# Patient Record
Sex: FEMALE | Race: WHITE
[De-identification: names, ages, dates, MRNs, and addresses within clinical notes are randomized per-mention and may not be internally consistent; named-entity substitution may affect disease eponyms.]

---

## 2017-08-12 NOTE — ERA
ER Documentation


Chief Complaint


Date/Time


DATE: 8/12/17 


TIME: 01:49


Chief Complaint


intermittent chest pain x 8 days, sob today





HPI


This 47-year-old female presents with chest pain on and off for 8 days.  Today 

when she had the pain she had shortness of breath.  She currently has 

substernal pain described as a pressure-like sensation.  Denies all cardiac 

risk factors.





ROS


All systems reviewed and are negative except as per history of present illness.





Medications


Home Meds


Active Scripts


Omeprazole* (Omeprazole*) 20 Mg Capsule.dr, 20 MG PO DAILY, #30 CAP


   Prov:SEVERO NARVAEZ          8/12/17


Ranitidine Hcl* (Zantac*) 150 Mg Tablet, 150 MG PO BID, #60 TAB


   Prov:SOLANGESEVERO DO         8/12/17


Discontinued Scripts


Nitrofurantoin Monohyd Macrocr* (Macrobid*) 100 Mg Capsr, 100 MG PO BID for 7 

Days, CAP


   Prov:CHARIS NEAL MD         3/18/16


Pantoprazole (Protonix) 40 Mg Tabec, 40 MG PO DAILY, #30 TAB


   Prov:LIVAN DUEÑAS MD         2/2/16


Metoclopramide* (Reglan*) 10 Mg Tablet, 10 MG PO Q6H Y for NAUSEA AND OR 

VOMITING, #30 TAB


   Prov:LIVAN DUEÑAS MD         2/2/16


Hydrocodone Bit/Acetaminophen (Anexsia 5-325 Mg Tablet) 1 Tab Tab, 1 TAB PO Q8 

Y for MODERATE PAIN LEVEL 4-6, #30 TAB


   Prov:LIVAN DUEÑAS MD         12/16/15


Ibuprofen* (Ibuprofen*) 600 Mg Tab, 600 MG PO Q8 Y for Fever, pain , #30 TAB


   Prov:LIVAN DUEÑAS MD         12/16/15





Allergies


Allergies:  


Coded Allergies:  


     No Known Allergy (Unverified , 8/12/17)





PMhx/Soc


History of Surgery:  Yes (hx of tubal ligation, hx of cystoscopy and stent 

placement)


Anesthesia Reaction:  No


Hx Neurological Disorder:  No


Hx Respiratory Disorders:  No


Hx Cardiac Disorders:  No


Hx Psychiatric Problems:  No


Hx Miscellaneous Medical Probl:  No


Hx Alcohol Use:  No


Hx Substance Use:  No


Hx Tobacco Use:  No


Smoking Status:  Never smoker





Physical Exam


Vitals





Vital Signs








  Date Time  Temp Pulse Resp B/P Pulse Ox O2 Delivery O2 Flow Rate FiO2


 


8/12/17 00:01 98.0 70 20 175/87 100   








Physical Exam


Const:  []           Mild distress


Head:   Atraumatic 


Eyes:    Normal Conjunctiva


ENT:    Normal External Ears, Nose and Mouth.


Neck:               Full range of motion..~ No meningismus.


Resp:    Clear to auscultation bilaterally


Cardio:    Regular rate and rhythm, no murmurs


Abd:    Soft, non tender, non distended. Normal bowel sounds


Skin:    No petechiae or rashes


Back:    No midline or flank tenderness


Ext:    No cyanosis, or edema


Neur:    Awake and alert and oriented 3, no focal deficits


Psych:    Normal Mood and Affect


Result Diagram:  


8/12/17 0034 8/12/17 0034





Results 24 hrs





 Laboratory Tests








Test


  8/12/17


00:34


 


White Blood Count 5.810^3/ul 


 


Red Blood Count 4.4910^6/ul 


 


Hemoglobin 12.5g/dl 


 


Hematocrit 36.5% 


 


Mean Corpuscular Volume 81.3fl 


 


Mean Corpuscular Hemoglobin 27.8pg 


 


Mean Corpuscular Hemoglobin


Concent 34.2g/dl 


 


 


Red Cell Distribution Width 13.9% 


 


Platelet Count 36300^3/UL 


 


Mean Platelet Volume 10.8fl 


 


Neutrophils % 41.1% 


 


Lymphocytes % 45.6% 


 


Monocytes % 10.4% 


 


Eosinophils % 2.4% 


 


Basophils % 0.3% 


 


Nucleated Red Blood Cells % 0.0/100WBC 


 


Neutrophils # 2.410^3/ul 


 


Lymphocytes # 2.610^3/ul 


 


Monocytes # 0.610^3/ul 


 


Eosinophils # 0.110^3/ul 


 


Basophils # 0.010^3/ul 


 


Nucleated Red Blood Cells # 0.010^3/ul 


 


Prothrombin Time 12.6Sec 


 


Prothrombin Time Ratio 1.0 


 


INR International Normalized


Ratio 0.94 


 


 


Activated Partial


Thromboplast Time 26.3Sec 


 


 


Sodium Level 139mmol/L 


 


Potassium Level 4.7mmol/L 


 


Chloride Level 103mmol/L 


 


Carbon Dioxide Level 22mmol/L 


 


Anion Gap 19 


 


Blood Urea Nitrogen 18mg/dl 


 


Creatinine 0.83mg/dl 


 


Glucose Level 88mg/dl 


 


Calcium Level 9.7mg/dl 


 


Troponin I < 0.012ng/ml 


 


B-Type Natriuretic Peptide 47PG/ML 








 Current Medications








 Medications


  (Trade)  Dose


 Ordered  Sig/Zenobia


 Route


 PRN Reason  Start Time


 Stop Time Status Last Admin


Dose Admin


 


 Aspirin


  (Aspirin)  325 mg  ONCE  STAT


 PO


   8/12/17 00:31


 8/12/17 00:32 DC 8/12/17 00:40


 


 


 Miscellaneous


 Medication


  (Gi Cocktail (2))  40 ml  ONCE  ONCE


 PO


   8/12/17 01:30


 8/12/17 01:31 DC 8/12/17 01:26


 











Procedures/MDM


Chest pain that is likely secondary to acid reflux.  Cardiac workup was 

performed patient was given aspirin initially.  She still had chest pain and to 

the description of it being only along the distribution esophagus I ordered a 

GI cocktail.  As soon as the viscous lidocaine was while the patient's pain was 

completely relieved.  She has a negative troponin and a completely normal EKG.  

I doubt acute coronary syndrome.  Admit to discharge with primary care follow-

up and instructions to obtain outpatient echo.  I am also discharging her with 

Zantac and Prilosec.





EKG interpretation: Normal sinus rhythm at 62, normal axis, no ST or T-wave 

changes are ischemia, normal intervals.  Normal EKG


Chest x-ray interpretation: I see no acute process.  I see no pneumothorax, no 

pulmonary edema, no infiltrate, no fractures


Cardiac monitor interpretation: Normal sinus rhythm without arrhythmia





Departure


Diagnosis:  


 Primary Impression:  


 GERD (gastroesophageal reflux disease)


 Additional Impression:  


 Chest pain


Condition:  Stable


Patient Instructions:  Chest Pain, Uncertain Cause, Gerd (Adult)





Additional Instructions:  


Call your primary care doctor TOMORROW for an appointment during the next 2-3 

days.  Request a referral for an ECHOCARDIOGRAM.  See the doctor sooner or 

return here if your condition worsens before your appointment time.











SEVERO NARVAEZ DO Aug 12, 2017 01:51

## 2017-08-12 NOTE — RADRPT
PROCEDURE:   Portable chest x-ray. 

 

CLINICAL INDICATION:   Chest pain. 

 

TECHNIQUE:   Portable AP view of the chest. 

 

COMPARISON:   03/15/2016.

 

FINDINGS:

No pulmonary edema or conolidation is identified.  The cardiac silhouette is magnified. The right co
stophrenic angle is incompletely imaged.  No pleural effusion is seen.  There is no pneumothorax.  

 

IMPRESSION:

1.  No evidence of acute cardiopulmonary disease. 

 

 

 

 

RPTAT: HTAR

_____________________________________________ 

.Brent Jhaveri MD, MD           Date    Time 

Electronically viewed and signed by .Brent Jhaveri MD, MD on 08/12/2017 01:22 

 

D:  08/12/2017 01:22  T:  08/12/2017 01:22

.R/

## 2017-12-28 ENCOUNTER — HOSPITAL ENCOUNTER (EMERGENCY)
Age: 47
Discharge: HOME | End: 2017-12-28

## 2017-12-28 ENCOUNTER — HOSPITAL ENCOUNTER (EMERGENCY)
Dept: HOSPITAL 91 - FTE | Age: 47
Discharge: HOME | End: 2017-12-28
Payer: COMMERCIAL

## 2017-12-28 DIAGNOSIS — R10.9: Primary | ICD-10-CM

## 2017-12-28 LAB
ADD MAN DIFF?: NO
ADD UMIC: YES
ALANINE AMINOTRANSFERASE: 35 IU/L (ref 13–69)
ALBUMIN/GLOBULIN RATIO: 1.36
ALBUMIN: 4.5 G/DL (ref 3.3–4.9)
ALKALINE PHOSPHATASE: 93 IU/L (ref 42–121)
ANION GAP: 14 (ref 8–16)
ASPARTATE AMINO TRANSFERASE: 27 IU/L (ref 15–46)
BASOPHIL #: 0 10^3/UL (ref 0–0.1)
BASOPHILS %: 0.3 % (ref 0–2)
BILIRUBIN,DIRECT: 0 MG/DL (ref 0–0.2)
BILIRUBIN,TOTAL: 0.2 MG/DL (ref 0.2–1.3)
BLOOD UREA NITROGEN: 11 MG/DL (ref 7–20)
CALCIUM: 9.3 MG/DL (ref 8.4–10.2)
CARBON DIOXIDE: 24 MMOL/L (ref 21–31)
CHLORIDE: 107 MMOL/L (ref 97–110)
CREATININE: 0.78 MG/DL (ref 0.44–1)
EOSINOPHILS #: 0.1 10^3/UL (ref 0–0.5)
EOSINOPHILS %: 1.7 % (ref 0–7)
GLOBULIN: 3.3 G/DL (ref 1.3–3.2)
GLUCOSE: 89 MG/DL (ref 70–220)
HEMATOCRIT: 41.1 % (ref 37–47)
HEMOGLOBIN: 13.7 G/DL (ref 12–16)
LIPASE: 42 U/L (ref 23–300)
LYMPHOCYTES #: 1.7 10^3/UL (ref 0.8–2.9)
LYMPHOCYTES %: 28.7 % (ref 15–51)
MEAN CORPUSCULAR HEMOGLOBIN: 27.3 PG (ref 29–33)
MEAN CORPUSCULAR HGB CONC: 33.3 G/DL (ref 32–37)
MEAN CORPUSCULAR VOLUME: 81.9 FL (ref 82–101)
MEAN PLATELET VOLUME: 9.9 FL (ref 7.4–10.4)
MONOCYTE #: 0.4 10^3/UL (ref 0.3–0.9)
MONOCYTES %: 6.8 % (ref 0–11)
NEUTROPHIL #: 3.6 10^3/UL (ref 1.6–7.5)
NEUTROPHILS %: 62.3 % (ref 39–77)
NUCLEATED RED BLOOD CELLS #: 0 10^3/UL (ref 0–0)
NUCLEATED RED BLOOD CELLS%: 0 /100WBC (ref 0–0)
PLATELET COUNT: 355 10^3/UL (ref 140–415)
POTASSIUM: 3.8 MMOL/L (ref 3.5–5.1)
RED BLOOD COUNT: 5.02 10^6/UL (ref 4.2–5.4)
RED CELL DISTRIBUTION WIDTH: 13.6 % (ref 11.5–14.5)
SODIUM: 141 MMOL/L (ref 135–144)
TOTAL PROTEIN: 7.8 G/DL (ref 6.1–8.1)
UR ASCORBIC ACID: NEGATIVE MG/DL
UR BILIRUBIN (DIP): NEGATIVE MG/DL
UR BLOOD (DIP): (no result) MG/DL
UR CLARITY: (no result)
UR COLOR: YELLOW
UR GLUCOSE (DIP): NEGATIVE MG/DL
UR KETONES (DIP): NEGATIVE MG/DL
UR LEUKOCYTE ESTERASE (DIP): (no result) LEU/UL
UR NITRITE (DIP): NEGATIVE MG/DL
UR PH (DIP): 5 (ref 5–9)
UR RBC: > 182 /HPF (ref 0–5)
UR SPECIFIC GRAVITY (DIP): 1.02 (ref 1–1.03)
UR TOTAL PROTEIN (DIP): NEGATIVE MG/DL
UR UROBILINOGEN (DIP): NEGATIVE MG/DL
UR WBC: 41 /HPF (ref 0–5)
WHITE BLOOD COUNT: 5.8 10^3/UL (ref 4.8–10.8)

## 2017-12-28 PROCEDURE — 83690 ASSAY OF LIPASE: CPT

## 2017-12-28 PROCEDURE — 87086 URINE CULTURE/COLONY COUNT: CPT

## 2017-12-28 PROCEDURE — 85025 COMPLETE CBC W/AUTO DIFF WBC: CPT

## 2017-12-28 PROCEDURE — 99285 EMERGENCY DEPT VISIT HI MDM: CPT

## 2017-12-28 PROCEDURE — 96375 TX/PRO/DX INJ NEW DRUG ADDON: CPT

## 2017-12-28 PROCEDURE — 96374 THER/PROPH/DIAG INJ IV PUSH: CPT

## 2017-12-28 PROCEDURE — 80053 COMPREHEN METABOLIC PANEL: CPT

## 2017-12-28 PROCEDURE — 76775 US EXAM ABDO BACK WALL LIM: CPT

## 2017-12-28 PROCEDURE — 81001 URINALYSIS AUTO W/SCOPE: CPT

## 2017-12-28 PROCEDURE — 36415 COLL VENOUS BLD VENIPUNCTURE: CPT

## 2017-12-28 RX ADMIN — HYDROCODONE BITARTRATE AND ACETAMINOPHEN 1 TAB: 5; 325 TABLET ORAL at 18:41

## 2017-12-28 RX ADMIN — CEFTRIAXONE 1 MLS/HR: 1 INJECTION, SOLUTION INTRAVENOUS at 18:41

## 2017-12-28 RX ADMIN — THIAMINE HYDROCHLORIDE 1 MLS/HR: 100 INJECTION, SOLUTION INTRAMUSCULAR; INTRAVENOUS at 16:34

## 2017-12-28 RX ADMIN — KETOROLAC TROMETHAMINE 1 MG: 30 INJECTION, SOLUTION INTRAMUSCULAR at 16:43

## 2017-12-28 RX ADMIN — ONDANSETRON HYDROCHLORIDE 1 MG: 2 INJECTION, SOLUTION INTRAMUSCULAR; INTRAVENOUS at 16:42

## 2018-01-08 ENCOUNTER — HOSPITAL ENCOUNTER (EMERGENCY)
Dept: HOSPITAL 91 - FTE | Age: 48
Discharge: HOME | End: 2018-01-08
Payer: COMMERCIAL

## 2018-01-08 ENCOUNTER — HOSPITAL ENCOUNTER (EMERGENCY)
Age: 48
Discharge: HOME | End: 2018-01-08

## 2018-01-08 DIAGNOSIS — B96.5: ICD-10-CM

## 2018-01-08 DIAGNOSIS — N39.0: Primary | ICD-10-CM

## 2018-01-08 PROCEDURE — 96374 THER/PROPH/DIAG INJ IV PUSH: CPT

## 2018-01-08 PROCEDURE — 99284 EMERGENCY DEPT VISIT MOD MDM: CPT

## 2018-01-08 RX ADMIN — CEFEPIME HYDROCHLORIDE 1 MLS/HR: 2 INJECTION, POWDER, FOR SOLUTION INTRAVENOUS at 14:46

## 2018-04-16 ENCOUNTER — HOSPITAL ENCOUNTER (EMERGENCY)
Dept: HOSPITAL 91 - FTE | Age: 48
Discharge: HOME | End: 2018-04-16
Payer: COMMERCIAL

## 2018-04-16 ENCOUNTER — HOSPITAL ENCOUNTER (EMERGENCY)
Age: 48
Discharge: HOME | End: 2018-04-16

## 2018-04-16 DIAGNOSIS — R30.0: Primary | ICD-10-CM

## 2018-04-16 LAB
URINE LEUKOCYTE EST (DIP) POC: (no result)
URINE PH (DIP) POC: 7.5 (ref 5–8.5)

## 2018-04-16 PROCEDURE — 81003 URINALYSIS AUTO W/O SCOPE: CPT

## 2018-04-16 PROCEDURE — 81025 URINE PREGNANCY TEST: CPT

## 2018-04-16 PROCEDURE — 99283 EMERGENCY DEPT VISIT LOW MDM: CPT

## 2018-04-16 RX ADMIN — IBUPROFEN 1 MG: 600 TABLET ORAL at 22:03

## 2018-04-16 RX ADMIN — PHENAZOPYRIDINE HYDROCHLORIDE 1 MG: 100 TABLET ORAL at 21:28

## 2018-04-16 RX ADMIN — CEPHALEXIN 1 MG: 500 CAPSULE ORAL at 21:30
